# Patient Record
Sex: MALE | Race: WHITE | NOT HISPANIC OR LATINO | Employment: FULL TIME | URBAN - METROPOLITAN AREA
[De-identification: names, ages, dates, MRNs, and addresses within clinical notes are randomized per-mention and may not be internally consistent; named-entity substitution may affect disease eponyms.]

---

## 2018-01-12 ENCOUNTER — OFFICE VISIT (OUTPATIENT)
Dept: URGENT CARE | Facility: MEDICAL CENTER | Age: 58
End: 2018-01-12
Payer: COMMERCIAL

## 2018-01-12 PROCEDURE — 99283 EMERGENCY DEPT VISIT LOW MDM: CPT

## 2018-01-12 PROCEDURE — G0382 LEV 3 HOSP TYPE B ED VISIT: HCPCS

## 2018-01-14 NOTE — PROGRESS NOTES
Assessment   1  Acute sinusitis (461 9) (J01 [id number])    Plan   Acute sinusitis    · Amoxicillin-Pot Clavulanate 875-125 MG Oral Tablet; TAKE 1 TABLET EVERY 12    HOURS DAILY    Discussion/Summary   Discussion Summary:    Today your symptoms are consistent with sinusitis the augmentin as directed with food try taking antihistamine such as Zyrtec or Claritin for symptom support Use Nasal spray Flonase as directed  Increase clear fluids at home and you can alternate Tylenol/Ibuprofen as needed for symptom control Warm salt H20 gargles/lozenges as needed for sore throat Try using a humidifier in your bedroom   with your primary care physician for re-evaluation within 5-7 days you have worsening symptoms or any signs of distress please go to the nearest ER      Medication Side Effects Reviewed: Possible side effects of new medications were reviewed with the patient/guardian today  Understands and agrees with treatment plan: The treatment plan was reviewed with the patient/guardian  The patient/guardian understands and agrees with the treatment plan    Counseling Documentation With Imm: The patient was counseled regarding instructions for management  Follow Up Instructions: Follow Up with your Primary Care Provider in 5-7 days  If your symptoms worsen, go to the nearest [place] [id number] Emergency Department  Chief Complaint   Chief Complaint Free Text Note Form: Cold sx x 1 week; fever x 1 day  Left-sided maxillary -sinus/periorbital pain starting today  History of Present Illness   HPI: 51-year-old male presents for evaluation of 1 week worth of sinus pressure, nasal congestion, bilateral ear pain  Patient states he is also developing left sided upper jaw tenderness  He denies any ear bleeding or discharge  He denies any chest pain, shortness of breath, difficulty breathing, abdominal pain, nausea vomiting/diarrhea   He has been taking over-the-counter ibuprofen and has tried Afrin nasal spray which does provide him some relief but he states that he does not like taking on a regular basis  He denies any history of diabetes, COPD, emphysema, asthma  He denies any chills  Hospital Based Practices Required Assessment:      Pain Assessment      the patient states they have pain  (on a scale of 0 to 10, the patient rates the pain at at times ranging as high as 5-6 )      Abuse And Domestic Violence Screen       Yes, the patient is safe at home  -- The patient states no one is hurting them  Depression And Suicide Screen  No, the patient has not had thoughts of hurting themself  No, the patient has not felt depressed in the past 7 days  Review of Systems   Focused-Male:      Constitutional: as noted in HPI       ENT: as noted in HPI  Cardiovascular: as noted in HPI  Respiratory: as noted in HPI  ROS Reviewed:    ROS reviewed  Past Medical History   Active Problems And Past Medical History Reviewed: The active problems and past medical history were reviewed and updated today  Family History   Family History Reviewed: The family history was reviewed and updated today  Social History   Social History Reviewed: The social history was reviewed and updated today  The social history was reviewed and is unchanged  Surgical History   Surgical History Reviewed: The surgical history was reviewed and updated today  Current Meds    1  No Reported Medications Recorded  Medication List Reviewed: The medication list was reviewed and updated today  Allergies   1  No Known Drug Allergies    Vitals   Signs   Recorded: 12Jan2018 09:04PM   Temperature: 100 F  Heart Rate: 96  Respiration: 18  Systolic: 167  Diastolic: 81  BP Cuff Size: Large  Height: 5 ft 10 in  Weight: 175 lb   BMI Calculated: 25 11  BSA Calculated: 1 97  O2 Saturation: 96  Pain Scale: 6    Physical Exam        Constitutional      General appearance: No acute distress, well appearing and well nourished  Eyes      Conjunctiva and lids: No swelling, erythema, or discharge  Pupils and irises: Equal, round and reactive to light  Ears, Nose, Mouth, and Throat      External inspection of ears and nose: Normal        Otoscopic examination: Tympanic membrance translucent with normal light reflex  Canals patent without erythema  Nasal mucosa, septum, and turbinates: Abnormal  -- Bilateral hypertrophic turbinates  Oropharynx: Abnormal  -- erythema in the posterior oropharynx without exudates  Pulmonary      Respiratory effort: No increased work of breathing or signs of respiratory distress  Auscultation of lungs: Clear to auscultation  Cardiovascular      Auscultation of heart: Normal rate and rhythm, normal S1 and S2, without murmurs  Psychiatric      Orientation to person, place and time: Normal        Mood and affect: Normal        Additional Exam:  tenderness to palpation of the left maxillary sinus        Signatures    Electronically signed by : SAM Varghese; Jan 12 2018  9:19PM EST                       (Author)     Electronically signed by : BARRETT Doyle ; Jan 13 2018 10:16AM EST

## 2018-01-23 VITALS
WEIGHT: 175 LBS | DIASTOLIC BLOOD PRESSURE: 81 MMHG | HEART RATE: 96 BPM | SYSTOLIC BLOOD PRESSURE: 119 MMHG | TEMPERATURE: 100 F | RESPIRATION RATE: 18 BRPM | BODY MASS INDEX: 25.05 KG/M2 | OXYGEN SATURATION: 96 % | HEIGHT: 70 IN